# Patient Record
Sex: MALE | Race: WHITE | NOT HISPANIC OR LATINO | URBAN - METROPOLITAN AREA
[De-identification: names, ages, dates, MRNs, and addresses within clinical notes are randomized per-mention and may not be internally consistent; named-entity substitution may affect disease eponyms.]

---

## 2021-02-18 ENCOUNTER — TRANSFERRED RECORDS (OUTPATIENT)
Dept: HEALTH INFORMATION MANAGEMENT | Facility: CLINIC | Age: 56
End: 2021-02-18

## 2021-02-26 ENCOUNTER — TRANSFERRED RECORDS (OUTPATIENT)
Dept: HEALTH INFORMATION MANAGEMENT | Facility: CLINIC | Age: 56
End: 2021-02-26

## 2021-03-11 ENCOUNTER — TRANSFERRED RECORDS (OUTPATIENT)
Dept: HEALTH INFORMATION MANAGEMENT | Facility: CLINIC | Age: 56
End: 2021-03-11

## 2021-03-11 ENCOUNTER — MEDICAL CORRESPONDENCE (OUTPATIENT)
Dept: HEALTH INFORMATION MANAGEMENT | Facility: CLINIC | Age: 56
End: 2021-03-11

## 2021-03-11 LAB
ALT SERPL-CCNC: 39 U/L (ref 0–55)
AST SERPL-CCNC: 86 U/L (ref 0–35)
CREAT SERPL-MCNC: 0.79 MG/DL (ref 0.8–1.3)
GFR SERPL CREATININE-BSD FRML MDRD: >90 ML/MIN/1.73M2
GLUCOSE SERPL-MCNC: 90 MG/DL (ref 70–100)
POTASSIUM SERPL-SCNC: 4.3 MEQ/L (ref 3.5–5.3)

## 2021-03-19 ENCOUNTER — REFERRAL (OUTPATIENT)
Dept: TRANSPLANT | Facility: CLINIC | Age: 56
End: 2021-03-19

## 2021-03-19 VITALS — BODY MASS INDEX: 24.34 KG/M2 | WEIGHT: 170 LBS | HEIGHT: 70 IN

## 2021-03-19 DIAGNOSIS — C22.0 HCC (HEPATOCELLULAR CARCINOMA) (H): Primary | ICD-10-CM

## 2021-03-19 SDOH — HEALTH STABILITY: MENTAL HEALTH: HOW OFTEN DO YOU HAVE 6 OR MORE DRINKS ON ONE OCCASION?: NOT ASKED

## 2021-03-19 SDOH — HEALTH STABILITY: MENTAL HEALTH: HOW MANY STANDARD DRINKS CONTAINING ALCOHOL DO YOU HAVE ON A TYPICAL DAY?: NOT ASKED

## 2021-03-19 SDOH — HEALTH STABILITY: MENTAL HEALTH: HOW OFTEN DO YOU HAVE A DRINK CONTAINING ALCOHOL?: NOT ASKED

## 2021-03-19 ASSESSMENT — MIFFLIN-ST. JEOR: SCORE: 1604.42

## 2021-03-19 NOTE — TELEPHONE ENCOUNTER
Patient was asked the following questions during liver intake call.     Referring Provider: Jessica Kaur   Referring Diagnosis: Alcoholic Cirrhosis of the Liver/HCV/HCC  PCP: Kala Espinosa     1)Do you know why you have liver disease: Yes             If Alcoholic Cirrhosis is present when was your last drink:11/2020             Have you ever been through treatment for alcohol: Yes, 4 years ago.   2) Presence of Ascites: No Paracentesis: No  3) Presence of Hepatic Encephalopathy: Yes Medications: Yes  4) History of GI Bleeding: Yes  5) Oxygen Use: None  6) EGD: Yes Where: [place] When: 2020  7) Colonoscopy: None  8) MELD Score:   9) Insurance information: [facility]       Policy sales: Self       Subscriber/policy/ID number: [id number]      Group Number: [id number]    Referral intake process completed.  Patient is aware that after financial approval is received, medical records will be requested.   Patient confirmed for a callback from transplant coordinator on 3/24/21.  Tentative evaluation date TBD.    Confirmed coordinator will discuss evaluation process in more detail at the time of their call.   Patient is aware of the need to arrange age appropriate cancer screening, vaccinations, and dental care.  Reminded patient to complete questionnaire, complete medical records release, and review packet prior to evaluation visit .  Assessed patient for special needs (ie--wheelchair, assistance, guardian, and ):  None  Patient instructed to call [phone] with questions.     Patient gave verbal consent during intake call to obtain medical records and documents outside of [facility]:  Yes    FABY Link, LPN   Solid Organ Transplant
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
## 2021-03-19 NOTE — LETTER
3/25/2021    Juarez Warren  [address] [place] [id number]      Dear Juarez,    Thank you for your interest in the Transplant Center at [facility]. We look forward to being a part of your care team and assisting you through the transplant process.    As we discussed, your transplant coordinator is Jovanna Silverio (Liver).  You may call your coordinator at any time with questions or concerns call [phone].    Please complete the following.    1. Fill out and return the enclosed forms    Authorization for Electronic Communication    Authorization to Discuss Protected Health Information    Authorization for Release of Protected Health Information    Authorization for Care Everywhere Release of Information    2. Sign up for:    [employer] [facility], access to your electronic medical record (see enclosed pamphlet)    [email or web address], a transplant education website    You can use these tools to learn more about your transplant, communicate with your care team, and track your medical details      Sincerely,  Solid Organ Transplant  [facility]    cc: Referring Physician and PCP

## 2021-03-24 ENCOUNTER — TELEPHONE (OUTPATIENT)
Dept: TRANSPLANT | Facility: CLINIC | Age: 56
End: 2021-03-24

## 2021-03-24 NOTE — TELEPHONE ENCOUNTER
Patient Call: General  Route to LPN    Reason for call: connect with pt regarding referral     Call back needed? Yes    Return Call Needed  Same as documented in contacts section  When to return call?: Greater than one day: Route standard priority

## 2021-03-24 NOTE — TELEPHONE ENCOUNTER
Left message reiterating with Juarez the plan for this RN to call early next week once plan is in place for virtual consult or full evaluation depending on TC and insurance authorization.

## 2021-03-26 ENCOUNTER — DOCUMENTATION ONLY (OUTPATIENT)
Dept: TRANSPLANT | Facility: CLINIC | Age: 56
End: 2021-03-26

## 2021-03-26 NOTE — PROGRESS NOTES
Jaurez Warren discussed at the multidisciplinary tumor board on 2021. The attendees may consist of representatives from hepatology, oncology, radiation oncology, surgical subspecialty including liver transplant, pathology and radiology.    MRI 21 Imagin.3 cm lesion, not classic characterstic of HCC    Plan/Recommendations:  MRI locally now    FABY SalN, RN  Liver transplant coordinator  [phone] Office

## 2021-03-29 ENCOUNTER — TELEPHONE (OUTPATIENT)
Dept: TRANSPLANT | Facility: CLINIC | Age: 56
End: 2021-03-29

## 2021-03-29 DIAGNOSIS — F10.11 H/O ETOH ABUSE: Primary | ICD-10-CM

## 2021-03-29 NOTE — TELEPHONE ENCOUNTER
Patient Call: 1). Stated he was having issue with docusign-then stated he was able to see the forms  Then was asking about upcoming appts; if they were here or at [facility]??  Then ask writer who did he call??      Call back needed? Yes    Return Call Needed  Same as documented in contacts section  When to return call?: Same day: Route High Priority

## 2021-03-29 NOTE — LETTER
PHYSICIAN ORDERS  [phone]    DATE & TIME ISSUED: :04 AM  PATIENT NAME: Juarez Warren   : 1965     [facility] MR# : [id number]     DIAGNOSIS:  Cirrhosis  ICD-10 CODE: K74.60    MRI of liver     PLEASE FAX RESULTS and PUSH FILM AS SOON AS POSSIBLE TO [phone], ATTN:ORTIZ Nugent, [phone], with questions  .

## 2021-03-29 NOTE — Clinical Note
Please add to held spot with Dr. Hall on 4/26/21 and call patient to confirm. Confirm means of virtual visit.  Thanks! Jovanna

## 2021-03-31 NOTE — TELEPHONE ENCOUNTER
Spoke to Jessica BRAGA of MAULIK Kaur and discussed the plan for MRI and then virtual f/u with patient. She will facilitate ordering of MRI, order faxed.

## 2021-04-06 ENCOUNTER — TRANSFERRED RECORDS (OUTPATIENT)
Dept: HEALTH INFORMATION MANAGEMENT | Facility: CLINIC | Age: 56
End: 2021-04-06

## 2021-04-07 NOTE — TELEPHONE ENCOUNTER
Spoke with the patient and confirmed Liver Consult appointments on 4/26/21.  Informed patient an itinerary can be accessed on REHt.

## 2021-04-07 NOTE — TELEPHONE ENCOUNTER
Sent to scheduling 4/26 with Dr. Hall. Will discuss at TC this week but appears no HCC.  Will defer  since patient was non receptive to openly discuss past alcohol use.

## 2021-04-09 ENCOUNTER — TELEPHONE (OUTPATIENT)
Dept: TRANSPLANT | Facility: CLINIC | Age: 56
End: 2021-04-09

## 2021-04-09 ENCOUNTER — DOCUMENTATION ONLY (OUTPATIENT)
Dept: TRANSPLANT | Facility: CLINIC | Age: 56
End: 2021-04-09

## 2021-04-09 NOTE — TELEPHONE ENCOUNTER
Patient Call: Voicemail  Date/Time: 4/9/21 @ 8:18 am   Reason for call: regarding missing forms to fill out and return.

## 2021-04-09 NOTE — PROGRESS NOTES
Juarez Warren discussed at the multidisciplinary tumor board on 4/9/21. The attendees may consist of representatives from hepatology, oncology, radiation oncology, surgical subspecialty including liver transplant, pathology and radiology.    MRI 4/6/21 Imaging:  No HCC  [id number]    Plan/Recommendations:  MRI 6 month surveillance    SHERRY Sal, RN  Liver transplant coordinator

## 2021-04-09 NOTE — TELEPHONE ENCOUNTER
Left message informing Juarez not to worry about paperwork and be ready for the video virtual on 4/26/21. If he has further concerns, to call. .

## 2021-04-13 NOTE — TELEPHONE ENCOUNTER
RECORDS RECEIVED FROM: Internal   Appt Date: 04.26.2021   NOTES STATUS DETAILS   OFFICE NOTE from referring provider Internal 04.07.2021 Shirley Hall MD   OFFICE NOTES from other specialists Care Everywhere 03.19.2021 Jessica Kaur, APRN-CNP     02.22.2021  Claudia Samano MD     DISCHARGE SUMMARY from hospital N/A    MEDICATION LIST N/A    LIVER BIOSPY (IF APPLICABLE)      PATHOLOGY REPORTS  N/A    IMAGING     ENDOSCOPY (IF AVAILABLE) Care Everywhere 04.06.2021   COLONOSCOPY (IF AVAILABLE) Care Everywhere 04.09.2021   ULTRASOUND LIVER N/A    CT OF ABDOMEN Care Everywhere 02.15.2021 CT Abd Pelvis   MRI OF LIVER Care Everywhere 04.06.2021, 02.26.2021 MRI ABDOMEN WITH AND WITHOUT CONTRAST     FIBROSCAN, US ELASTOGRAPHY, FIBROSIS SCAN, MR ELASTOGRAPHY N/A    LABS     HEPATIC PANEL (LIVER PANEL) N/A    BASIC METABOLIC PANEL Care Everywhere 03.19.2021   COMPLETE METABOLIC PANEL Care Everywhere 03.19.2021   COMPLETE BLOOD COUNT (CBC) Care Everywhere 03.19.2021   INTERNATIONAL NORMALIZED RATIO (INR) Care Everywhere 03.19.2021   HEPATITIS C ANTIBODY N/A    HEPATITIS C VIRAL LOAD/PCR N/A    HEPATITIS C GENOTYPE N/A    HEPATITIS B SURFACE ANTIGEN Care Everywhere 03.19.2021   HEPATITIS B SURFACE ANTIBODY Care Everywhere 03.19.2021   HEPATITIS B DNA QUANT LEVEL N/A    HEPATITIS B CORE ANTIBODY Care Everywhere 03.19.2021

## 2021-04-26 ENCOUNTER — VIRTUAL VISIT (OUTPATIENT)
Dept: GASTROENTEROLOGY | Facility: CLINIC | Age: 56
End: 2021-04-26
Attending: INTERNAL MEDICINE
Payer: COMMERCIAL

## 2021-04-26 ENCOUNTER — PRE VISIT (OUTPATIENT)
Dept: GASTROENTEROLOGY | Facility: CLINIC | Age: 56
End: 2021-04-26

## 2021-04-26 DIAGNOSIS — F10.11 H/O ETOH ABUSE: ICD-10-CM

## 2021-04-26 PROCEDURE — 99205 OFFICE O/P NEW HI 60 MIN: CPT | Mod: 95 | Performed by: INTERNAL MEDICINE

## 2021-04-26 RX ORDER — TAMSULOSIN HYDROCHLORIDE 0.4 MG/1
0.4 CAPSULE ORAL
COMMUNITY
Start: 2021-02-17

## 2021-04-26 RX ORDER — ONDANSETRON 4 MG/1
4 TABLET, ORALLY DISINTEGRATING ORAL
COMMUNITY
Start: 2020-06-21

## 2021-04-26 RX ORDER — FLUTICASONE PROPIONATE 50 MCG
1 SPRAY, SUSPENSION (ML) NASAL
COMMUNITY
Start: 2020-06-21 | End: 2021-06-26

## 2021-04-26 RX ORDER — HYDROXYZINE PAMOATE 25 MG/1
25-50 CAPSULE ORAL
COMMUNITY

## 2021-04-26 RX ORDER — AMILORIDE HYDROCHLORIDE 5 MG/1
5 TABLET ORAL
COMMUNITY
Start: 2021-03-08 | End: 2022-03-13

## 2021-04-26 RX ORDER — LACTULOSE 10 G/15ML
30 SOLUTION ORAL
COMMUNITY
Start: 2021-02-18

## 2021-04-26 RX ORDER — PROPRANOLOL HYDROCHLORIDE 10 MG/1
10 TABLET ORAL
COMMUNITY
Start: 2021-02-03

## 2021-04-26 RX ORDER — PREGABALIN 200 MG/1
200 CAPSULE ORAL
COMMUNITY
Start: 2021-02-26

## 2021-04-26 RX ORDER — TIZANIDINE 2 MG/1
1 TABLET ORAL
COMMUNITY
Start: 2021-02-26

## 2021-04-26 RX ORDER — RIBAVIRIN 200 MG/1
600 TABLET, FILM COATED ORAL
COMMUNITY
Start: 2021-03-02

## 2021-04-26 ASSESSMENT — PAIN SCALES - GENERAL: PAINLEVEL: NO PAIN (0)

## 2021-04-26 NOTE — PROGRESS NOTES
"  [facility]  LIVER CLINIC CONSULTATION  VIDEO VISIT    CC/REFERRING PROVIDER:  Jesisca Kaur NP [facility]  REASON FOR CONSULTATION: HCV and ETOH cirrhosis    ASSESSMENT/PLAN:  Mr. Warren is a 55 Y M with ETOH and HCV related cirrhosis and possible liver lesion. MELD 20 AOB    Cirrhosis 2/2 ETOH and HCV. Decompensated with ascites, HE and synthetic dysfunction. Improved liver function since stopping ETOH 11/2020. TB peak 13.5, now 2.5. May continue to improve with continues sobriety and treatment of HCV.    Hepatic encephalopathy Controlled on lactulose  Ascites Noted on most recent MRI, mild.  Never had paracentesis.  Currently on diuretics and controlled.  Dscussed sodium restriction.  Esophageal/Gastric varices  EGD 11/2020 small varices.  He is currently on propanolol, but they have not been able to increase his dose.  Liver lesion Most recent MRI from 4/2021 reviewed all tumor board without any evidence of HCC. LIRADS 3. Recommend 6 month followup.    AUD severe in early remission PETH 3/2021 negative.    Mental health Patient states that he feels like his life is worthless and slipping away.  He feels quite hopeless. No plans to harm himself.  Symptoms significant for major depression, and he has a planned visit with his mental health provider in July 2021. Discussed that this is important to help treat his depression, as well as management of his polysubstance abuse and alcohol use disorder.  This is a major step if he is to be ever considered for liver transplantation in the future.     In addition, he has documentation of narcissistic personality disorder. Although this is unclear, based on my one interaction with him, his distrust of therapists, and feelings of alcohol treatment not being \"good enough\" may be a sign of this.    Transplant candidacy At this time, he has had significant improvement from his hospitalization up till now.  He is less than 6 months sober, and has no evidence of " HCC.  Based on these findings, transplant is not indicated at this time.  It is reasonable to continue to monitor, and if he worsens then liver transplant may be discussed again.  It is important to note that he has significant barriers to being a liver transplant candidate . These include poor social support, and mental health issues [patient has significant depression, history of drug and alcohol use, possible narcissistic personality disorder, and his distrust of therapists and alcohol treatment strategies].  RECOMMENDATIONS:  -- Resume followup with [facility] team.  -- See Dr. Hall in [place] in July.  -- Follow up with  as planned  -- Continue to avoid alcohol  -- Lactulose PO, titrate to 3-4 BMs per day  -- Consider re-application for HCV treatment  -- Needs SW follow up in [place]   -- Continue exercising to increases strength  -- Recommend repeat MRI liver in 3-6 months   -- Ensure sodium restriction to 2000 mg per day  -- Continue diuretics  -- Adequate protein diet (1.2 - 1.5g/kg/day)   - Recommend multiple meals during the day, Snacks and shakes during the day/night   - Can use Ensure/Boost drinks TID     Colorectal Cancer Screening Due in 7 - 10 years      The visit lasted up to 40 minutes, with more than half of the time spent on counseling and education. All questions were answered to patient's satisfaction    Patient seen and discussed with staff GI physician, Dr. Hall, who agrees with my assessment and plan.     Steve Avalos MD  Transplant Hepatology fellow  PGY 6  [phone]    -----------------------------------------------------------------------------------------------------------------------------------------    HPI: 55 year old male with a medical history significant for alcohol use disorder, prior polysubstance abuse, hepatitis C infection and hypertension with consequent development of cirrhosis and portal hypertension; seen for evaluation for severe liver disease and  "possible liver lesion.    Patient states that he was diagnosed with hepatitis C about 20 to 30 years ago.  He stated that he was treated with PEG interferon at that time and had improvement in his viral load.  However he was using IV cocaine at that time and was reinfected.  He has not had any treatments for this again, and did well until November 2020 when he was admitted into the hospital with nausea, hematemesis, and melena as well as fatigue and weakness.  He was found to have cirrhosis at that time.  He underwent an upper endoscopy that was significant for severe esophagitis, and a colonoscopy that showed internal hemorrhoids.  Since then he has been followed by his local GI provider.    He has mild ascites and pedal edema managed with diuretics.  He also developed hepatic encephalopathy while he was in the hospital, and is currently on lactulose for this.  He takes this twice a day and has about 2-3 bowel movements per day.  He notes that he has episodes of confusion that are mild and states that he sometimes is forgetful.  He denies any fevers, chills, chest pain, difficulty breathing, repeat episodes of melena, or hematemesis, or hematochezia.  He has never had paracentesis done in the past.  Recently, he had an MRI that showed a lesion that was concerning for HCC however a repeat MRI in April noted that this lesion was possibly a dysplastic nodule. He was attempted to be started on HCV treatment but was denied by his insurance.    His major issue now is that he feels like his \"life is slipping away\".  He feels like he will continue to get worse, and feels like his liver is continuing to worsen.  Since his hospitalization he has noted that his strength is improving, and he is working out with 5 pound weights to improve his strength.  He has had trouble with his diet stemming from his prolonged drug and alcohol use.  He was previously homeless and is now living in an apartment.  He is currently in the process " of applying for Social Security disability to help him improve his diet.  He said per his last discussion with his , things are looking pretty good in terms of his Social Security benefits.    ROS: 10pt ROS performed and otherwise negative.    PERTINENT PAST MEDICAL HISTORY:  Cirrhosis with portal hypertension  Alcohol use disorder  Chronic back pain  Hepatitis C infection  Hypertension  Traumatic brain injury  Narcissistic personality disorder    PREVIOUS ABDOMINAL/GYNECOLOGIC SURGERIES:  L5-S1 spine surgery in 2008  Cholecystectomy in the 80's  Herniorrhaphy (umbilical) in the 80's  Appendectomy in the 90's    PREVIOUS ENDOSCOPY:  Colonoscopy/9/2021: 1 polyp removed, internal hemorrhoids.  EGD 11/2020: LA grade C esophagitis, small esophageal varices without any evidence of recent or active bleeding.    PERTINENT MEDICATIONS:    aMILoride (MIDAMOR) 5 mg tablet Take 1 tablet (5 mg) by mouth 2 times a day 180 tablet 3     ribavirin 200 mg tablet Take 3 tablets (600 mg) by mouth 1 time per day 84 tablet 2     omeprazole (PRILOSEC) 20 mg capsule Take 1 capsule (20 mg) by mouth 1 time a day in the morning 180 capsule 4     sofosbuvir-velpatasvir (EPCLUSA) 400-100 mg tablet Take 1 tablet by mouth 1 time per day 28 tablet 2     pregabalin (LYRICA) 200 mg capsule Take 200 mg by mouth 2 times a day 0     tiZANidine (ZANAFLEX) 2 mg tablet Take 0.5 tablets (1 mg) by mouth every night at bedtime 45 tablet 4     gabapentin (NEURONTIN) 300 mg capsule Take 1 capsule (300 mg) by mouth 3 times a day Patient would like the medication delivered (Patient not taking: Reported on 3/11/2021) 90 capsule 3     lactulose 10 GM/15ML oral solution Take 30 mL by mouth 2 times a day 1892 mL 6     tamsulosin (FLOMAX) 0.4 mg capsule Take 1 capsule (0.4 mg) by mouth 1 time per day 30 capsule 3     spironolactone (ALDACTONE) 50 mg tablet Take 1 tablet (50 mg) by mouth 1 time per day 30 tablet 4     propranolol (INDERAL) 10 mg tablet  Take 1 tablet (10 mg) by mouth 1 time per day 30 tablet 4     ondansetron (ZOFRAN ODT) 4 mg dispersible tablet Take 1 tablet (4 mg) by mouth Every 4 hours as needed for nausea or vomiting 20 tablet 0     fluticasone (FLONASE) 50 mcg/spray nasal spray Spray 1 spray into each nostril 2 times a day 3 Bottle 0     traZODone (DESYREL) 50 mg tablet Take 50 mg by mouth every night at bedtime     hydrOXYzine pamoate (VISTARIL) 25 mg capsule Take 25-50 mg by mouth 4 times a day as needed for anxiety     SOCIAL HISTORY:  Patient admits to alcohol use, stating that he would drink 6 to 12 cans of beer per day.  He had a lot of alcohol during Easter of last year, and was sober for a little bit and then had more alcohol on the day of admission in November 2020.  He has had 3 DUIs, the last one coming about 5 years ago.  He stated that he has had treatment in the past, and does not like them because he feels like they did not work.  He denies any hospitalizations for withdrawal or intoxication.  His last drink was in November 2020.  Patient admits to cocaine use, methamphetamine use, refuses about 3 years ago.  He states that he has not used cocaine for many many years.  He denies any tobacco use.  He has been on disability for over 10 years given his back pain.  He previously worked as a .  States that he has a stepfamily that he is  from.    FAMILY HISTORY:  No colon/panc/esophageal/other GI CA, no other HNPCC-related Toy. No IBD/celiac, no other AI/liver/thyroid disease.    PHYSICAL EXAMINATION:    General: Alert, not in respiratory or painful distress, Not toxic looking  HEENT: anicteric sclera, PERRL,   Lungs: Unlabored breathing  Skin: no rashes, cyanosis, or jaundice  Neuro:  AOx3, CN II-XII intact and symmetric, No focal neurologic deficits  MSK:No discernible joint swelling or tenderness  Psych: Seems depressed      PERTINENT STUDIES:  Sodium 132, potassium 4.1, chloride 105, BUN 11, creatinine  0.97.  Hemoglobin 14.4, WBC 4.6, platelet 46.  AST 62, ALT 34, alkaline phosphatase 133, bilirubin 2.5.  INR 1.7.  Hepatitis C RNA: ~500,000 [2/2/2021]  PETh negative on 3/11/2021     MELD-Na 20    MRI 4/6/2021:  Cirrhosis, 1cm dysplastic nodule, small ascites      Juarez is a 55 year old who is being evaluated via a billable video visit.      ** patient can do Doximity **    How would you like to obtain your AVS? Mail a copy  If the video visit is dropped, the invitation should be resent by: Text to cell phone: [phone]  Will anyone else be joining your video visit? No      Video Start Time: 1000  Video-Visit Details  Type of service:  Video Visit  Video End Time:1039  Originating Location (pt. Location): Home\  Distant Location (provider location):  [facility] [place]   Platform used for Video Visit: DoximWedding Spot

## 2021-04-26 NOTE — LETTER
4/26/2021         RE: Juarez Warren  [address]  [address]  [place] [address]        Dear Colleague,    Thank you for referring your patient, Juarez Warren, to the [facility] [place]. Please see a copy of my visit note below.      [facility]  LIVER CLINIC CONSULTATION  VIDEO VISIT    CC/REFERRING PROVIDER:  Jessica Kaur NP [facility]  REASON FOR CONSULTATION: HCV and ETOH cirrhosis    ASSESSMENT/PLAN:  Mr. Warren is a 55 Y M with ETOH and HCV related cirrhosis and possible liver lesion. MELD 20 AOB    Cirrhosis 2/2 ETOH and HCV. Decompensated with ascites, HE and synthetic dysfunction. Improved liver function since stopping ETOH 11/2020. TB peak 13.5, now 2.5. May continue to improve with continues sobriety and treatment of HCV.    Hepatic encephalopathy Controlled on lactulose  Ascites Noted on most recent MRI, mild.  Never had paracentesis.  Currently on diuretics and controlled.  Dscussed sodium restriction.  Esophageal/Gastric varices  EGD 11/2020 small varices.  He is currently on propanolol, but they have not been able to increase his dose.  Liver lesion Most recent MRI from 4/2021 reviewed all tumor board without any evidence of HCC. LIRADS 3. Recommend 6 month followup.    AUD severe in early remission PETH 3/2021 negative.    Mental health Patient states that he feels like his life is worthless and slipping away.  He feels quite hopeless. No plans to harm himself.  Symptoms significant for major depression, and he has a planned visit with his mental health provider in July 2021. Discussed that this is important to help treat his depression, as well as management of his polysubstance abuse and alcohol use disorder.  This is a major step if he is to be ever considered for liver transplantation in the future.     In addition, he has documentation of narcissistic personality disorder. Although this is unclear, based on my one interaction with him, his  "distrust of therapists, and feelings of alcohol treatment not being \"good enough\" may be a sign of this.    Transplant candidacy At this time, he has had significant improvement from his hospitalization up till now.  He is less than 6 months sober, and has no evidence of HCC.  Based on these findings, transplant is not indicated at this time.  It is reasonable to continue to monitor, and if he worsens then liver transplant may be discussed again.  It is important to note that he has significant barriers to being a liver transplant candidate . These include poor social support, and mental health issues [patient has significant depression, history of drug and alcohol use, possible narcissistic personality disorder, and his distrust of therapists and alcohol treatment strategies].  RECOMMENDATIONS:  -- Resume followup with [facility] team.  -- See Dr. Hall in [place] in July.  -- Follow up with  as planned  -- Continue to avoid alcohol  -- Lactulose PO, titrate to 3-4 BMs per day  -- Consider re-application for HCV treatment  -- Needs SW follow up in [place]   -- Continue exercising to increases strength  -- Recommend repeat MRI liver in 3-6 months   -- Ensure sodium restriction to 2000 mg per day  -- Continue diuretics  -- Adequate protein diet (1.2 - 1.5g/kg/day)   - Recommend multiple meals during the day, Snacks and shakes during the day/night   - Can use Ensure/Boost drinks TID     Colorectal Cancer Screening Due in 7 - 10 years      The visit lasted up to 40 minutes, with more than half of the time spent on counseling and education. All questions were answered to patient's satisfaction    Patient seen and discussed with staff GI physician, Dr. Hall, who agrees with my assessment and plan.     Steve Avalos MD  Transplant Hepatology fellow  PGY " "6  [phone]    -----------------------------------------------------------------------------------------------------------------------------------------    HPI: 55 year old male with a medical history significant for alcohol use disorder, prior polysubstance abuse, hepatitis C infection and hypertension with consequent development of cirrhosis and portal hypertension; seen for evaluation for severe liver disease and possible liver lesion.    Patient states that he was diagnosed with hepatitis C about 20 to 30 years ago.  He stated that he was treated with PEG interferon at that time and had improvement in his viral load.  However he was using IV cocaine at that time and was reinfected.  He has not had any treatments for this again, and did well until November 2020 when he was admitted into the hospital with nausea, hematemesis, and melena as well as fatigue and weakness.  He was found to have cirrhosis at that time.  He underwent an upper endoscopy that was significant for severe esophagitis, and a colonoscopy that showed internal hemorrhoids.  Since then he has been followed by his local GI provider.    He has mild ascites and pedal edema managed with diuretics.  He also developed hepatic encephalopathy while he was in the hospital, and is currently on lactulose for this.  He takes this twice a day and has about 2-3 bowel movements per day.  He notes that he has episodes of confusion that are mild and states that he sometimes is forgetful.  He denies any fevers, chills, chest pain, difficulty breathing, repeat episodes of melena, or hematemesis, or hematochezia.  He has never had paracentesis done in the past.  Recently, he had an MRI that showed a lesion that was concerning for HCC however a repeat MRI in April noted that this lesion was possibly a dysplastic nodule. He was attempted to be started on HCV treatment but was denied by his insurance.    His major issue now is that he feels like his \"life is " "slipping away\".  He feels like he will continue to get worse, and feels like his liver is continuing to worsen.  Since his hospitalization he has noted that his strength is improving, and he is working out with 5 pound weights to improve his strength.  He has had trouble with his diet stemming from his prolonged drug and alcohol use.  He was previously homeless and is now living in an apartment.  He is currently in the process of applying for Social Security disability to help him improve his diet.  He said per his last discussion with his , things are looking pretty good in terms of his Social Security benefits.    ROS: 10pt ROS performed and otherwise negative.    PERTINENT PAST MEDICAL HISTORY:  Cirrhosis with portal hypertension  Alcohol use disorder  Chronic back pain  Hepatitis C infection  Hypertension  Traumatic brain injury  Narcissistic personality disorder    PREVIOUS ABDOMINAL/GYNECOLOGIC SURGERIES:  L5-S1 spine surgery in 2008  Cholecystectomy in the 80's  Herniorrhaphy (umbilical) in the 80's  Appendectomy in the 90's    PREVIOUS ENDOSCOPY:  Colonoscopy/9/2021: 1 polyp removed, internal hemorrhoids.  EGD 11/2020: LA grade C esophagitis, small esophageal varices without any evidence of recent or active bleeding.    PERTINENT MEDICATIONS:    aMILoride (MIDAMOR) 5 mg tablet Take 1 tablet (5 mg) by mouth 2 times a day 180 tablet 3     ribavirin 200 mg tablet Take 3 tablets (600 mg) by mouth 1 time per day 84 tablet 2     omeprazole (PRILOSEC) 20 mg capsule Take 1 capsule (20 mg) by mouth 1 time a day in the morning 180 capsule 4     sofosbuvir-velpatasvir (EPCLUSA) 400-100 mg tablet Take 1 tablet by mouth 1 time per day 28 tablet 2     pregabalin (LYRICA) 200 mg capsule Take 200 mg by mouth 2 times a day 0     tiZANidine (ZANAFLEX) 2 mg tablet Take 0.5 tablets (1 mg) by mouth every night at bedtime 45 tablet 4     gabapentin (NEURONTIN) 300 mg capsule Take 1 capsule (300 mg) by mouth 3 times a " day Patient would like the medication delivered (Patient not taking: Reported on 3/11/2021) 90 capsule 3     lactulose 10 GM/15ML oral solution Take 30 mL by mouth 2 times a day 1892 mL 6     tamsulosin (FLOMAX) 0.4 mg capsule Take 1 capsule (0.4 mg) by mouth 1 time per day 30 capsule 3     spironolactone (ALDACTONE) 50 mg tablet Take 1 tablet (50 mg) by mouth 1 time per day 30 tablet 4     propranolol (INDERAL) 10 mg tablet Take 1 tablet (10 mg) by mouth 1 time per day 30 tablet 4     ondansetron (ZOFRAN ODT) 4 mg dispersible tablet Take 1 tablet (4 mg) by mouth Every 4 hours as needed for nausea or vomiting 20 tablet 0     fluticasone (FLONASE) 50 mcg/spray nasal spray Spray 1 spray into each nostril 2 times a day 3 Bottle 0     traZODone (DESYREL) 50 mg tablet Take 50 mg by mouth every night at bedtime     hydrOXYzine pamoate (VISTARIL) 25 mg capsule Take 25-50 mg by mouth 4 times a day as needed for anxiety     SOCIAL HISTORY:  Patient admits to alcohol use, stating that he would drink 6 to 12 cans of beer per day.  He had a lot of alcohol during Easter of last year, and was sober for a little bit and then had more alcohol on the day of admission in November 2020.  He has had 3 DUIs, the last one coming about 5 years ago.  He stated that he has had treatment in the past, and does not like them because he feels like they did not work.  He denies any hospitalizations for withdrawal or intoxication.  His last drink was in November 2020.  Patient admits to cocaine use, methamphetamine use, refuses about 3 years ago.  He states that he has not used cocaine for many many years.  He denies any tobacco use.  He has been on disability for over 10 years given his back pain.  He previously worked as a .  States that he has a stepfamily that he is  from.    FAMILY HISTORY:  No colon/panc/esophageal/other GI CA, no other HNPCC-related Toy. No IBD/celiac, no other AI/liver/thyroid disease.    PHYSICAL  EXAMINATION:    General: Alert, not in respiratory or painful distress, Not toxic looking  HEENT: anicteric sclera, PERRL,   Lungs: Unlabored breathing  Skin: no rashes, cyanosis, or jaundice  Neuro:  AOx3, CN II-XII intact and symmetric, No focal neurologic deficits  MSK:No discernible joint swelling or tenderness  Psych: Seems depressed      PERTINENT STUDIES:  Sodium 132, potassium 4.1, chloride 105, BUN 11, creatinine 0.97.  Hemoglobin 14.4, WBC 4.6, platelet 46.  AST 62, ALT 34, alkaline phosphatase 133, bilirubin 2.5.  INR 1.7.  Hepatitis C RNA: ~500,000 [2/2/2021]  PETh negative on 3/11/2021     MELD-Na 20    MRI 4/6/2021:  Cirrhosis, 1cm dysplastic nodule, small ascites      Juarez is a 55 year old who is being evaluated via a billable video visit.      ** patient can do Doximity **    How would you like to obtain your AVS? Mail a copy  If the video visit is dropped, the invitation should be resent by: Text to cell phone: [phone]  Will anyone else be joining your video visit? No      Video Start Time: 1000  Video-Visit Details  Type of service:  Video Visit  Video End Time:1039  Originating Location (pt. Location): Home\  Distant Location (provider location):  [facility]PATOLOG[facility] [place]   Platform used for Video Visit: [employer]             Again, thank you for allowing me to participate in the care of your patient.        Sincerely,        Shirley Hall MD

## 2021-11-17 ENCOUNTER — TELEPHONE (OUTPATIENT)
Dept: TRANSPLANT | Facility: CLINIC | Age: 56
End: 2021-11-17
Payer: MEDICAID

## 2021-11-17 ENCOUNTER — REFERRAL (OUTPATIENT)
Dept: TRANSPLANT | Facility: CLINIC | Age: 56
End: 2021-11-17
Payer: MEDICAID

## 2021-11-17 DIAGNOSIS — K70.30 CIRRHOSIS, ALCOHOLIC (H): Primary | ICD-10-CM

## 2021-11-17 DIAGNOSIS — K70.30 ALCOHOLIC CIRRHOSIS (H): Primary | ICD-10-CM

## 2021-11-17 NOTE — TELEPHONE ENCOUNTER
lvm for patient, per Dr. RAYA Hall, should be seen virtually by Dr. Hall, phone appointment ith social work, re- referred for potential new lesion.    Orders to scheduling

## 2021-11-17 NOTE — Clinical Note
Dr. Shirley Hall wanted to re-refer this velvet.  I opened a skeleton of a new referral episode just to document.  I left a voice mail for the patient, we are just starting him with hepatology and social work consults.  Marily, I don't think you need to call him per se, just whatever else needed in encounter, etc. And all the stuff I don't know how to do in the episode.

## 2021-11-17 NOTE — TELEPHONE ENCOUNTER
Re-referred per Dr. Shirley Hall for new liver lesion;  Plan: virtual appointments with RAYA Hall and social work to start, will await appointments and determine f will continue with alfieal

## 2021-11-17 NOTE — Clinical Note
Scheduling, I just want to make sure this is in work queue after speaking with patient this morning, orders sent last week.  He should have phone appt with Maria E first, then video follow up appt with Dr. Hall (both next available, just make sure social work is first, please.)  Maria E,  No CD eval ordered as of now.  Not sure if you ever met with him when he was referral in April, was Jovanna's patient at that time.  Lots of psychosocial issues, very resistant to taking about CD hx so no CD eval ordered unless further direction from you.

## 2021-11-17 NOTE — LETTER
2/23/2021    Juarez Warren  [address] [place] [id number]      Dear Juarez,    Thank you for your interest in the Transplant Center at [facility]. We look forward to being a part of your care team and assisting you through the transplant process.    As we discussed, your transplant coordinator is Deborah Malik (Liver).  You may call your coordinator at any time with questions or concerns call [phone].    Please complete the following.    1. Fill out and return the enclosed forms    Authorization for Electronic Communication    Authorization to Discuss Protected Health Information    Authorization for Release of Protected Health Information    Authorization for Care Everywhere Release of Information    2. Sign up for:    [employer], access to your electronic medical record (see enclosed pamphlet)    [email or web address], a transplant education website    You can use these tools to learn more about your transplant, communicate with your care team, and track your medical details      Sincerely,  Solid Organ Transplant  [facility]    cc: Referring Physician and PCP

## 2021-11-18 VITALS — HEIGHT: 70 IN | BODY MASS INDEX: 25.77 KG/M2 | WEIGHT: 180 LBS

## 2021-11-18 ASSESSMENT — MIFFLIN-ST. JEOR: SCORE: 1652.72

## 2021-11-18 NOTE — TELEPHONE ENCOUNTER
Patient was asked the following questions during liver intake call.     Referring Provider: Jessica Kaur   Referring Diagnosis: Liver Lesion/Alcoholic Cirrhosis   PCP: Dr. Lennox Holder    1)Do you know why you have liver disease: Yes       If Alcoholic Cirrhosis is present when was your last drink: November 2020       Have you ever been through treatment for alcohol: Yes  2) Presence of Ascites: Yes Paracentesis: Yes  3) Presence of Hepatic Encephalopathy: Yes Medications: Yes  4) History of GI Bleeding: Yes  5) Oxygen Use: None  6) EGD: Yes Where: [place] When: 2021  7) Colonoscopy: Yes Where: [place] When: 2021  8) MELD Score: 18  9) Labs available for review from PCP/GI: Yes   10) HCC Diagnosis: Not at this time. Liver lesion present.   11)Insurance information: [place] Medicaid (Getting BCBS after the first of the year)       Policy sales: Self       Subscriber/policy/ID number: [id number]      Group Number:     Referral intake process completed.  Patient is aware that after financial approval is received, medical records will be requested.   Patient confirmed for a callback from transplant coordinator on 11/22/21.  Tentative evaluation date TBD.    Confirmed coordinator will discuss evaluation process in more detail at the time of their call.   Patient is aware of the need to arrange age appropriate cancer screening, vaccinations, and dental care.  Reminded patient to complete questionnaire, complete medical records release, and review packet prior to evaluation visit .  Assessed patient for special needs (ie--wheelchair, assistance, guardian, and ):  None  Patient instructed to call [phone] with questions.     Patient gave verbal consent during intake call to obtain medical records and documents outside of [facility]:  Yes     FABY Link, LPN       Solid Organ Transplant

## 2021-11-22 ENCOUNTER — TELEPHONE (OUTPATIENT)
Dept: GASTROENTEROLOGY | Facility: CLINIC | Age: 56
End: 2021-11-22
Payer: MEDICAID

## 2021-11-22 NOTE — TELEPHONE ENCOUNTER
"LIVER DISEASE ETOH, HCC, HCV  REFERRING PROVIDER Jessica Kaur, [facility]  -----------------------------------------------------------------------------------------------------------------------------  MELD   18      ABO O       HISTORY OF LIVER DISEASE  Patient underwent transplant eval this spring for ETOH cirrhosis;  Was improving and had psychosocial concerns related to transplant candidacy, eval closed- see Dr. Hall note from April 2021 for detailed hx;  Patient with new 2 cm lesion concerning for HCC and re-referred    Repeatedly states he will discuss but resistant to CD eval or treatment;  Told him social work will discuss with him further; states he does not like to take about his chem dep history at all; states he doesn't know if he can say the right things to \"pass the test when talking about my drug and alcohol history\"     Major complaints now are abdominal pain, BLE edema and pain      IR Treatment for HCC planned for 12/1/21    Last ETOH approx 1 year ago  Drug use: using speed last 5-6 years ago; does smoke marijuana currently    Ascites  On Kyle and Furosmide  Du 2 x a week  TIPS no/yes  HE on lactulose  Kidney function  Variceal screening Last EGD. Location. Varices no/yes. Rec follow up.  HCC Screening MRI October 2021      Hospitalizations  ---------------------------------------------------------------------------------------------------------------------------------  PMH  TBI, narcissistic personality disorder  Seizures/stroke hx??  - need to investigate reported hx, pt can't remember  Heart murmur ? Reported in medical hx, pt doesn't remember    Diabetes no   Abdominal surgery appy; dilma, hernia repair    Colonoscopy April 2021; last EGD October 2021      SHX  Lives alone in apartment in [place];  Has brother and sister that \"help a little bit\"; former  as occupation, hasn't worked for 7-8 years ago, stopped due work comp injust on back,     Reports parents death as " significant event  ---------------------------------------------------------------------------------------------------------------------------------  LDLT Discussed no not at this time  PLAN  Per Dr. Hall, follow up with her and social work evaluation to start; consulting with IR locally on treatment plan for lesion; Dr. Hall's and social work appointments will determine next steps;

## 2021-11-22 NOTE — TELEPHONE ENCOUNTER
called patient to schedule Rafael & LINDSAY appts virtual and he confirmed for Mon, Dec 6.  But he has lots of questions that I can't answer, could you please call him.

## 2021-12-06 ENCOUNTER — VIRTUAL VISIT (OUTPATIENT)
Dept: GASTROENTEROLOGY | Facility: CLINIC | Age: 56
End: 2021-12-06
Attending: INTERNAL MEDICINE
Payer: MEDICAID

## 2021-12-06 DIAGNOSIS — K76.82 HEPATIC ENCEPHALOPATHY (H): ICD-10-CM

## 2021-12-06 DIAGNOSIS — Z76.82 AWAITING ORGAN TRANSPLANT STATUS: ICD-10-CM

## 2021-12-06 DIAGNOSIS — F10.21 ALCOHOL USE DISORDER, SEVERE, IN EARLY REMISSION (H): ICD-10-CM

## 2021-12-06 DIAGNOSIS — C22.0 HCC (HEPATOCELLULAR CARCINOMA) (H): ICD-10-CM

## 2021-12-06 DIAGNOSIS — K70.31 ALCOHOLIC CIRRHOSIS OF LIVER WITH ASCITES (H): Primary | ICD-10-CM

## 2021-12-06 PROCEDURE — G0463 HOSPITAL OUTPT CLINIC VISIT: HCPCS | Mod: PN,RTG | Performed by: INTERNAL MEDICINE

## 2021-12-06 PROCEDURE — 99214 OFFICE O/P EST MOD 30 MIN: CPT | Mod: 95 | Performed by: INTERNAL MEDICINE

## 2021-12-06 RX ORDER — ESCITALOPRAM OXALATE 5 MG/1
5 TABLET ORAL
COMMUNITY
Start: 2021-07-28 | End: 2022-08-02

## 2021-12-06 RX ORDER — BACLOFEN 5 MG/1
5 TABLET ORAL
COMMUNITY
Start: 2021-11-19 | End: 2022-11-24

## 2021-12-06 ASSESSMENT — PAIN SCALES - GENERAL: PAINLEVEL: MODERATE PAIN (4)

## 2021-12-06 NOTE — PROGRESS NOTES
"  [facility]  LIVER CLINIC CONSULTATION  VIDEO VISIT    CC/REFERRING PROVIDER:  Jessica Kaur NP [facility]  REASON FOR CONSULTATION: HCV and ETOH cirrhosis with HCC    ASSESSMENT/PLAN:  Mr. Warren is a 56 Y M with ETOH and HCV related cirrhosis and HCC. MELD 20 ABO O    Cirrhosis 2/2 ETOH and HCV. Decompensated with ascites, HE and synthetic dysfunction. Improved liver function since stopping ETOH 11/2020. TB peak 13.5, now 2.4.  HCC 2 cm s/p TACE at [place]. They will get MRI follow up.  Hepatic encephalopathy Controlled on lactulose  Ascites Noted on most recent MRI, mild.  Never had paracentesis.  Currently on diuretics and controlled.  Dscussed sodium restriction.  Esophageal/Gastric varices  EGD 11/2020 small varices.  He is currently on propanolol, but they have not been able to increase his dose.  Colorectal Cancer Screening Due in 7 - 10 years    AUD severe in early remission PET 3/2021 negative.    Mental health Significant concern with this> Long h/o mental health issues, currently no care and no follow up after July visit. We discussed this a rec follow up as previously recommended by behavioral health at [place].    In addition, he has documentation of narcissistic personality disorder. His distrust of therapists, and feelings of alcohol treatment not being \"good enough\" may be a sign of this.    Transplant candidacy He has significant barriers to transplant. These include poor social support and mental health issues (significant depression, significant history of drug and alcohol use, possible narcissistic personality disorder, and his distrust of therapists and alcohol treatment strategies). This was discussed with him today and with .    Will plan to see him back pending progress on these issues. In meantime will follow with Jessica Kaur NP [facility].      Shirley Hall MD    Hepatology/Liver Transplant  Medical Director, Liver Transplantation  [facility] " "[facility]    Appointments [phone]  Clinic Fax [phone]  Transplant Care [phone] Option 4  Transplant Fax [phone]  Administrative Office [phone]  Administrative Fax [phone]  ==========================================    HPI: Mr. Warren is a 56 Y M with HCC and ETOH-related cirrhosis. I met with him once 4/2021. Since that time he has developed imaging criteria for HCC: 2 cm lesion which we reviewed at tumor conference.    Today he tells me that he feels horrible with swelling, dark urine, guts hurt. His stool looks dark brown. Vomiting in the morning for a while off and on. He repeatedly states that his health is bad and that he is afraid. He is not giving up but he is tired.    HCC 2 cm dx10/19/21  s/p TACE 12/2/21    HCV  Dx ~20 y ago. Rx PEG interferon.  However was using IV cocaine at that time and was reinfected. He has not had any treatments for this again, and did well until November 2020 when he was admitted with nausea, hematemesis, melena, fatigue and weakness.  He was found to have cirrhosis at that time.   VICTORIANO  Last ETOH 11/2020  Usual pattern was  6-12 cans of beer/day.    He has had 3 DUIs, the last about 5 years ago.   Has had treatment in the past, states he didn't like it,didn't think it worked    Past cocaine, methamphetamine use, last about 3 years ago.  He states that he has not used cocaine for many many years.  He denies any tobacco use.  Ascites/ELÍAS Mild, managed with diuretics, furosemide 40 and amiloride 10 bid. No para. Swelling comes and goes and is better now.  HE on lactulose BM 2-3/d. Occ mild confusion and forgetfulness.  HCC 2 cm s/p TACE 12/2/21 in [place]    EGD 2020 severe esophagitis, 10/26/21 grade 1 varices  Colonoscopy 4/9/21 internal hemorrhoids. 6 mm polyp    Psychosocial He feels like his \"life is slipping away\". He feels like he will continue to get worse, like his liver is continuing to worsen.  He was previously homeless and is now living in " an apartment. He does not currently have mental health support. He states he has access to this. His visit in July 2021 with neurology: referred to neuropsych and behavioral health. He did not follow up on this.    Labs 12/1/21  TB 2.4  Na 127  Creat 0.8  INR 1.6    ROS: Cramping for which he takes baclofen, nerve pain on pregabalin.  PMH  Cirrhosis with portal hypertension  Alcohol use disorder  Chronic back pain  Hepatitis C infection  Hypertension  Traumatic brain injury  Narcissistic personality disorder  Polysubstance abuse  L5-S1 spine surgery in 2008  Cholecystectomy in the 80's  Herniorrhaphy (umbilical) in the 80's  Appendectomy in the 90's         SHX  He has been on disability for over 10 years given his back pain.  He previously worked as a .  In initial visit he stated that he has a stepfamily that he is  from. We talked more about them today and he now states he has family members around    FAMILY HISTORY  No colon/panc/esophageal/other GI CA, no other HNPCC-related Toy. No IBD/celiac, no other AI/liver/thyroid disease.    PHYSICAL EXAMINATION:  Exam  Gen Alert pleasant NAD  Resp No difficulty breathing. No cough  Skin No Jaundice  Eyes No icterus  Neuro ARCINIEGA  MSK no muscle wasting  Psyche Pleasant, appropriate. Well groomed.    Juarez Warren is a 56 year old male who is being evaluated via a billable video visit.    Video-Visit Details  Type of service:  Video Visit  Video Start Time: 830  Video End Time:854  Originating Location (pt. Location):home  Distant Location (provider location):  [facility] HEPATOLOGY CLINIC [place]      Platform used for Video Visit: [facility] or [place]

## 2021-12-06 NOTE — LETTER
"  12/6/2021     RE: Juarez Warren  [address] [place] [address]  [address] [place] [id number]    Dear Colleague,    Thank you for referring your patient, Juarez Warren, to the [facility] HEPATOLOGY [facility] [place]. Please see a copy of my visit note below.      [facility]  LIVER CLINIC CONSULTATION  VIDEO VISIT    CC/REFERRING PROVIDER:  Jessica Kaur NP [facility]  REASON FOR CONSULTATION: HCV and ETOH cirrhosis with HCC    ASSESSMENT/PLAN:  Mr. Warren is a 56 Y M with ETOH and HCV related cirrhosis and HCC. MELD 20 ABO O    Cirrhosis 2/2 ETOH and HCV. Decompensated with ascites, HE and synthetic dysfunction. Improved liver function since stopping ETOH 11/2020. TB peak 13.5, now 2.4.  HCC 2 cm s/p TACE at [facility]. They will get MRI follow up.  Hepatic encephalopathy Controlled on lactulose  Ascites Noted on most recent MRI, mild.  Never had paracentesis.  Currently on diuretics and controlled.  Dscussed sodium restriction.  Esophageal/Gastric varices  EGD 11/2020 small varices.  He is currently on propanolol, but they have not been able to increase his dose.  Colorectal Cancer Screening Due in 7 - 10 years    AUD severe in early remission PET 3/2021 negative.    Mental health Significant concern with this> Long h/o mental health issues, currently no care and no follow up after July visit. We discussed this a rec follow up as previously recommended by behavioral health at [facility].    In addition, he has documentation of narcissistic personality disorder. His distrust of therapists, and feelings of alcohol treatment not being \"good enough\" may be a sign of this.    Transplant candidacy He has significant barriers to transplant. These include poor social support and mental health issues (significant depression, significant history of drug and alcohol use, possible narcissistic personality disorder, and his distrust of therapists and alcohol treatment strategies). This was discussed with him today and " with LINDSAY.    Will plan to see him back pending progress on these issues. In meantime will follow with Jessica Kaur NP [facility].      Shirley Hall MD    Hepatology/Liver Transplant  Medical Director, Liver Transplantation  [facility]    Appointments [phone]  Clinic Fax [phone]  Transplant Care [phone] Option 4  Transplant Fax [phone]  Administrative Office [phone]  Administrative Fax [phone]  ==========================================    HPI: Mr. Warren is a 56 Y M with HCC and ETOH-related cirrhosis. I met with him once 4/2021. Since that time he has developed imaging criteria for HCC: 2 cm lesion which we reviewed at tumor conference.    Today he tells me that he feels horrible with swelling, dark urine, guts hurt. His stool looks dark brown. Vomiting in the morning for a while off and on. He repeatedly states that his health is bad and that he is afraid. He is not giving up but he is tired.    HCC 2 cm dx10/19/21  s/p TACE 12/2/21    HCV  Dx ~20 y ago. Rx PEG interferon.  However was using IV cocaine at that time and was reinfected. He has not had any treatments for this again, and did well until November 2020 when he was admitted with nausea, hematemesis, melena, fatigue and weakness.  He was found to have cirrhosis at that time.   VICTORIANO  Last ETOH 11/2020  Usual pattern was  6-12 cans of beer/day.    He has had 3 DUIs, the last about 5 years ago.   Has had treatment in the past, states he didn't like it,didn't think it worked    Past cocaine, methamphetamine use, last about 3 years ago.  He states that he has not used cocaine for many many years.  He denies any tobacco use.  Ascites/ELÍAS Mild, managed with diuretics, furosemide 40 and amiloride 10 bid. No para. Swelling comes and goes and is better now.  HE on lactulose BM 2-3/d. Occ mild confusion and forgetfulness.  HCC 2 cm s/p TACE 12/2/21 in [place]    EGD 2020 severe esophagitis, 10/26/21  "grade 1 varices  Colonoscopy 4/9/21 internal hemorrhoids. 6 mm polyp    Psychosocial He feels like his \"life is slipping away\". He feels like he will continue to get worse, like his liver is continuing to worsen.  He was previously homeless and is now living in an apartment. He does not currently have mental health support. He states he has access to this. His visit in July 2021 with neurology: referred to neuropsych and behavioral health. He did not follow up on this.    Labs 12/1/21  TB 2.4  Na 127  Creat 0.8  INR 1.6    ROS: Cramping for which he takes baclofen, nerve pain on pregabalin.  PMH  Cirrhosis with portal hypertension  Alcohol use disorder  Chronic back pain  Hepatitis C infection  Hypertension  Traumatic brain injury  Narcissistic personality disorder  Polysubstance abuse  L5-S1 spine surgery in 2008  Cholecystectomy in the 80's  Herniorrhaphy (umbilical) in the 80's  Appendectomy in the 90's         SHX  He has been on disability for over 10 years given his back pain.  He previously worked as a .  In initial visit he stated that he has a stepfamily that he is  from. We talked more about them today and he now states he has family members around    FAMILY HISTORY  No colon/panc/esophageal/other GI CA, no other HNPCC-related Toy. No IBD/celiac, no other AI/liver/thyroid disease.    PHYSICAL EXAMINATION:  Exam  Gen Alert pleasant NAD  Resp No difficulty breathing. No cough  Skin No Jaundice  Eyes No icterus  Neuro ARCINIEGA  MSK no muscle wasting  Psyche Pleasant, appropriate. Well groomed.    Juarez Warren is a 56 year old male who is being evaluated via a billable video visit.    Video-Visit Details  Type of service:  Video Visit  Video Start Time: 830  Video End Time:854  Originating Location (pt. Location):home  Distant Location (provider location):  [facility] HEPATOLOGY CLINIC [place]      Platform used for Video Visit: Segundo or Jamie    Again, thank you for allowing me to " participate in the care of your patient.      Sincerely,    Shirley Hall MD

## 2021-12-06 NOTE — PROGRESS NOTES
"Juarez is a 56 year old who is being evaluated via a billable video visit.      ** patient can do Doximity **    How would you like to obtain your AVS? MyChart  If the video visit is dropped, the invitation should be resent by: Text to cell phone: [phone]  Will anyone else be joining your video visit? No  {If patient encounters technical issues they should call [phone] :585255}    Video Start Time: {video visit start/end time for provider to select:152948}  Video-Visit Details    Type of service:  Video Visit    Video End Time:{video visit start/end time for provider to select:152948}    Originating Location (pt. Location): {video visit patient location:035885::\"Home\"}    Distant Location (provider location):  [facility] HEPATOLOGY CLINIC [place]     Platform used for Video Visit: {Virtual Visit Platforms:673748::\"[employer]\"}    "

## 2021-12-10 ENCOUNTER — TELEPHONE (OUTPATIENT)
Dept: GASTROENTEROLOGY | Facility: CLINIC | Age: 56
End: 2021-12-10
Payer: MEDICAID

## 2021-12-10 ENCOUNTER — DOCUMENTATION ONLY (OUTPATIENT)
Dept: GASTROENTEROLOGY | Facility: CLINIC | Age: 56
End: 2021-12-10
Payer: MEDICAID

## 2021-12-10 NOTE — TELEPHONE ENCOUNTER
Spoke to Jessica Kaur NP, [facility] regarding concerns for LT candidacy. She will see Mr. Warren in January with family members. We will stay in touch with progress.

## 2021-12-15 ENCOUNTER — TELEPHONE (OUTPATIENT)
Dept: TRANSPLANT | Facility: CLINIC | Age: 56
End: 2021-12-15
Payer: MEDICAID

## 2021-12-15 ENCOUNTER — TRANSFERRED RECORDS (OUTPATIENT)
Dept: HEALTH INFORMATION MANAGEMENT | Facility: CLINIC | Age: 56
End: 2021-12-15
Payer: MEDICAID

## 2021-12-15 NOTE — TELEPHONE ENCOUNTER
"Transplant Social Work Services Phone Call    Data: I called Juarez to inquire about his chemical dependency assessment. He completed on 12/14/2021 through [facility]. I provided my fax number for them to send this writer the assessment. I discussed recommendation to re-engage in behavioral health counseling. He reported that he does not have an issue and became upset because it doesn't have to do anything with his liver. I attempted to provide education, however patient was not able to process information. Juarez became upset. I re-focused the conversation on obtaining the chemical dependency assessment.     Intervention: Supportive listening and continued liver transplant evaluation.   Assessment: Juarez was labile throughout out conversation. He voiced that he is not going to go through any more \"hoops\" because these recommendations are not related to his liver. Throughout the conversation I asked clarifying questions due to patient moving from topic to topic. He would become upset with this writer and indicated that he needs to stop talking and he sometimes \"tell's people what they want to hear\"  Education provided by : No new education.   Plan/Recommendations:     The following are pending items    - Waiting for chemical dependency assessment. Will provide education regarding engagement in treatment  - Recommendation to engage in behavioral health (psychotherapy and psychiatry)   - Will need a caregiver/additional support  - consideration for updated neuropsychological testing.       HUGO Poon, [facility]  Liver Transplant   [facility]  Phone: [phone]  Pager: [phone]    "

## 2022-01-05 NOTE — TELEPHONE ENCOUNTER
I left a voice message for Juarez indicting that I have not received an assessment from [facility]. I left my direct phone number and fax number. I requested that he follows up with [facility].

## 2022-01-06 NOTE — TELEPHONE ENCOUNTER
I received information from [facility] that patient was seen for a substance use evaluation on 12/15/2021. Per assessment, there are no substance use recommendations based on that patient has been abstaining from alcohol for 12 months. Information sent to HIM to be scanned into patient's EMR.

## 2022-01-17 ENCOUNTER — TELEPHONE (OUTPATIENT)
Dept: TRANSPLANT | Facility: CLINIC | Age: 57
End: 2022-01-17

## 2022-01-17 ENCOUNTER — DOCUMENTATION ONLY (OUTPATIENT)
Dept: TRANSPLANT | Facility: CLINIC | Age: 57
End: 2022-01-17

## 2022-01-17 NOTE — TELEPHONE ENCOUNTER
"Transplant Social Work Services Phone Call      Data: This writer received two voice messaged from patient. He reported that he received a envelope from this writer with paperwork he needs to fill out and return (Health care directive and release of information). This writer asked Juarez to fill out DUNG after he re-establishes care with a mental health provider as recommended. Juarez indicated I his voice message that he believes that this is all \"bologna\" he asked this writer \"what do you think I am, you are wrong about me needing behavioral health\" He indicated in his voice message that this writer needs behavioral health for my psychological issues and that this writer believes she is better than everyone else. He reported that this writer is bullying him and said to \"quit being stupid about this\" He called back and reported that he never had any mental health issues or chemical health issues. He indicated that \"this writer needs mental health\" and ended the call.  Intervention: Deferred  Assessment: This writer previously discussed re-engaging in mental health support. Per his voice message he does not believe he needs mental health support.  Education provided by : No new education  Plan: This writer will continue to assist in liver transplant evaluation. Will address comments with patient if needed    HUGO Poon, [facility]  Liver Transplant   [facility]  Phone: [phone]  Pager: [phone]    "

## 2022-01-18 NOTE — PROGRESS NOTES
Patient not candidate at this time for psycho social concerns, see notes from SW as well as most recent note from Jessica Kaur, [facility] dated 1/14/22.  Will not contact patient further at this time, ill await contact from patient  To this provider or .      Per discussion with team and Dr. Hall, will plan to close referral at this time.

## 2022-01-24 ENCOUNTER — TELEPHONE (OUTPATIENT)
Dept: TRANSPLANT | Facility: CLINIC | Age: 57
End: 2022-01-24

## 2022-01-24 NOTE — TELEPHONE ENCOUNTER
"Transplant Social Work Services Phone Call    Data: I received a voice message from patient at 1700 on 1/21/2022 requesting a call back. See notes from Deborah Malik on 1/17/2022. I called Juarez and left a voice message on 1/24/2022.   I received a voice message from Jessica Kaur NP (ph: [phone]). She reported that she had a visit with Juarez on Friday. She indicated that he was very upset that his evaluation was being closed. Jessica inquired if we are able to also provide information to patient.   I received a call back from Juarez and his brother, Nico. I spoke with Juarez regarding evaluation for liver transplantation. Juarez reported that he forgets information and doesn't remember he is doing or saying. He reported that it is traumatic to review his chemical and mental health history. I provided education regarding the \"why\" we need to know this information. He voiced understanding. Today, Juarez reported that he had neuropsychological testing (with Dr Marie) scheduled and see's a psychiatrist through [facility]. He was unable to provide further information regarding this. I reported that I will send information Consent to communicate to Nico's e-mail ([email or web address]) and his home.   Recommendations are as follows: (recommdnations were communicated with Juarez and Nico)   - Establish care with a 1:1 counselor. Recommendation for ongoing and consistent appointments. Please fill out a release of information so I am able to speak with them.   - Neuropsychological testing  - Caregiver plan. TBD. Today Nico reported that he is able to provide support.     Intervention: Supportive listening   Assessment: Juarez had a hard time tracking information and remembering conversation today. He presented with his brother Nico via phone.   Education provided by SW: Reviewed recommendation   Plan: Information provided to Lissette and Shirley Hall.        HUGO Poon, [facility]  Liver Transplant   [facility] " [place]  Phone: [phone]  Pager: [phone]

## 2022-02-07 ENCOUNTER — TELEPHONE (OUTPATIENT)
Dept: TRANSPLANT | Facility: CLINIC | Age: 57
End: 2022-02-07

## 2022-02-07 NOTE — TELEPHONE ENCOUNTER
I received the following information from Juarez's brother, Nico.     Good evening Maria E.  Thanks again for working with Juarez and helping him out.  I will be with him tomorrow as he has a few appointments I will take him to [facility].  As he mentioned last Monday, I have been taking him to his liver appointments  2/week since November as a few other appointments that I am aware of.    I will try to call you if I get time tomorrow.  My contact information is below:    Nico Warren  [address] [place] [address]  [phone]    Thanks again.    Nico

## 2022-02-21 NOTE — TELEPHONE ENCOUNTER
"I received \"person to person communication consent\"   Juarez consented for [facility] to communication with the following     Nico Kelvin - Brother - [phone]  Fransiscavera Hernandes - Sister - [phone]  Wilfredo Hernandes - Brother in law - [phone]    Form sent to HIM to be scanned into EMR  "

## 2022-02-22 NOTE — TELEPHONE ENCOUNTER
I received a voice message from pt inquiring when he will be scheduled for his liver transplant evaluation.     I called Juarez and left a voice message requesting a call back to discuss recommendation. I also provided transplant coordinator's phone number to evaluation questions.     Recommendations previously sent to Nico and Juarez     - Establish care with a 1:1 counselor. Recommendation for ongoing and consistent appointments. Please fill out a release of information so I am able to speak with them.   - Neuropsychological testing  - Caregiver plan. TBD. Today Nico reported that he is able to provide support.

## 2022-02-28 NOTE — TELEPHONE ENCOUNTER
I received a completed copy of patient health care directive from patient's brother. I sent document to [facility] for review.    I inquired about establishing care with 1:1 mental health therapist and psychiatrist.     Plan: waiting for response.

## 2022-03-01 ENCOUNTER — DOCUMENTATION ONLY (OUTPATIENT)
Dept: OTHER | Facility: CLINIC | Age: 57
End: 2022-03-01

## 2022-03-02 NOTE — TELEPHONE ENCOUNTER
I received 3 voice message from patient on 3/1/2022. I returned his call and left a voice message on 3/3/2022 with a call back number     I provided information regarding the health care directive to Nico. Nico will complete with his brother and send to this writer.

## 2022-03-03 NOTE — TELEPHONE ENCOUNTER
I received a voice message on 3/3 requesting a call back. I called back and patient did not answer. I asked patient to leave days/times that he will be available. I also suggested that we can schedule an appointment.

## 2022-03-03 NOTE — TELEPHONE ENCOUNTER
Juarez and I spoke on the phone. I recommended that we send his brother Nico a summary of our conversation. The following information was sent to Nico with Juarezs permission.      - Recommendation to establish care with a 1:1 psychotherapist. They have psychotherapists at [facility]  - Sign a release of information for Aylin Lua through [facility] - I attached one as well as sent one in the mail  - Our team would like to have an idea of a caregiver plan if transplant were to occur with our center. We recommend that Juarez stays local within 50 miles of our center for about a month after transplant. We ask that someone stays with him 24/7 while he is here to provide caregiving.   Juarez reported that he has a neuropsych appointment on March 11th. He asked if he should keep the appointment and I reiterated multiple times throughout the conversation to keep all of his appointments.     I sent Juarez a release of information via mail as well per his request    Plan: Waiting for Juarez to establish care with a 1:1 provider. Waiting for DUNG for [facility]. Caregiver plan TBD

## 2022-03-03 NOTE — TELEPHONE ENCOUNTER
I received a call from Juarez. He inquired further about the recommendation. I reiterated recommendations for psychotherapy.     Juarez reported that he has an appointment at 0830 with [facility]. He reports that he was told by Jessica Kaur that he can only be evaluated with one transplant center. I reported to Juarez that he is not in evaluation at our center and I would recommend calling [facility] and/or insurance to make sure things are approved. He became frustrated at this writer due to not helping facilitate this call. I provided education that since this is not with out center and since it is a billing question, it would need to go through [facility] and insurance. Juarez reported that he is done with the conversation as this writer it not being helpful.

## 2022-03-16 ENCOUNTER — TELEPHONE (OUTPATIENT)
Dept: TRANSPLANT | Facility: CLINIC | Age: 57
End: 2022-03-16

## 2022-03-16 NOTE — TELEPHONE ENCOUNTER
Transplant Social Work Services Phone Call    Data: I received an e-mail from patient's brother asking to speak with this writer re: Juarez's status. I replied to email and indicated that Lance NIEVES Is his coordinator and would assist with any status, but this writer is happy to discuss.   I received a voice message from Nico indicating that he will call Lance. Nico noted that Juarez has some family near the [facility] and he would have adequate transportation to/from our center and would be able to stay with the family member in [place].   Intervention: consultation   Assessment: no new assessment at this time. Please see previous notes re: recommendations from this writer   Plan: This writer will be available for ongoing psychosocial support    HUGO Taylor, [facility]  Liver Transplant   [facility]  Phone: [phone]  Pager: [phone]

## 2022-05-24 ENCOUNTER — TELEPHONE (OUTPATIENT)
Dept: TRANSPLANT | Facility: CLINIC | Age: 57
End: 2022-05-24

## 2022-05-24 NOTE — TELEPHONE ENCOUNTER
Transplant Social Work Services Phone Call    Data: I consulted with our transplant team re: next steps for patient. Our center has closed his referral. Patient will need to be re-referred when appropriate.   Recommendation for patient to engage in 1:1 therapy. This has been communicated with patient and/or brother 6x. Juarez completed Neuropsychological testing on 3/26/2022 at [facility] with Loni Stubbs Ph.D. Testing recommended the following:   RECOMMENDATIONS  1) A review of the patient s psychiatric medications is recommended given his current reported mood symptoms. A follow-up with primary care or with Psychiatry is advised.   2) The patient may also benefit from individual psychotherapy to learn better coping skills and symptom management. A referral to Adult Behavioral Health is recommended.   3) From a cognitive perspective, the patient should be able to follow post-transplant care instructions and attend follow-up appointments.   I spoke with Jessica Kaur NP from [facility]. I reported that we've closed his referral due to psychosocial concerns. I indicated that I've spoke with pt and brother 6+ times regarding recommendation to establish care with a 1:1 psychotherapy, which was affirmed by his neuropsych testing. Patient was also referred earlier in 2021 and did not follow up. Concerns with compliance and unmanaged mental health. Jessica inquired at what point we would consider evaluating him for transplant. I indicated that he will need to establish care with a 1:1 therapist, and we will take into account their assessment of his mental health stability as well. Patient will need to show a pattern of consistent follow up. Jessica specifically asked if he has sworn or become angry with anyone at our center. I reported that he has with me, several times. She voiced understanding to re-refer once he has his mental health more managed, whether it be a formal referral, or conversation with this  writer/transplant team.    Intervention: Consultation for transplant   Assessment: Juarez is not a liver transplant candidate at this time. He has not demonstrated a pattern of compliance and presents with severe unmanaged mental health concerns. He has been referred to a mental health therapist by three different providers (hospitalization, [facility]/this writer, and neuropsychology testing) in the past year, and he has not followed through with recommendation.   Plan: Juarez will need to demonstrate consistent follow up with a mental health provider and his medical care. Jessica/BASSEM will re-refer when more appropriate.       HUGO Taylor, [facility]  Liver Transplant   [facility]  Phone: [phone]  Pager: [phone]

## 2022-05-26 NOTE — TELEPHONE ENCOUNTER
I received a call from Jessica inquiring further re: recommendation. She indicated that she could have psychiatry and/or psychotherapy see patient and clear/not clear him for transplant. I reported that we will need consistent follow up. Jessica requested that I send her a list of OP providers.

## 2022-10-26 ENCOUNTER — TELEPHONE (OUTPATIENT)
Dept: TRANSPLANT | Facility: CLINIC | Age: 57
End: 2022-10-26

## 2022-10-26 NOTE — TELEPHONE ENCOUNTER
Transplant Social Work Services Phone Call    Data: I received a voice message from Nico Warren, patient's brother. He reported that he spoke with this writer about a year go. Nico indicated that patient was evaluated by [facility] in the spring and was denied for transplant at [facility]. Nico inquired if we would re-consider him for transplant and what they will need to do to get him re-engaged with our program.   Intervention: deferred  Assessment: Juarez was referral to our transplant center by Jessica Kaur NP. His referral was closed d/t psychosocial concerns, specifically unmanaged mental health. Recommendation to establish care with a 1:1 therapist, and will need to have consistent follow-up.  Education provided by SW: Deferred  Plan: Message sent to Lissette MCKINNEY Regarding telephone call.     HUGO Taylor, [facility]  Liver Transplant   [facility]  Phone: [phone]  Pager: [phone]

## 2022-11-03 ENCOUNTER — TELEPHONE (OUTPATIENT)
Dept: TRANSPLANT | Facility: CLINIC | Age: 57
End: 2022-11-03

## 2022-11-03 NOTE — TELEPHONE ENCOUNTER
See recent encounter from Maria E social work.  Patient's brother had inquired about him potentially being considered for transplant eval    Reviewed with Maria E and Dr. Hall.     Confirmed with brother that he has not engaged in mental health care.  Per last discussion with patient and referring provider, patient needs to engage in mental health care and stabilize from that perspective until potential consideration.  Encouraged them to continue close follow up for GI care with [facility].  Patient's brother verbalizes understanding, patient still resistant at this time to mental health services.

## 2023-06-22 ENCOUNTER — TELEPHONE (OUTPATIENT)
Dept: TRANSPLANT | Facility: CLINIC | Age: 58
End: 2023-06-22

## 2023-06-22 NOTE — TELEPHONE ENCOUNTER
Juarez would like to see if he can be re-listed here at the [facility]  for Liver?      # [phone]

## 2023-07-11 ENCOUNTER — DOCUMENTATION ONLY (OUTPATIENT)
Dept: TRANSPLANT | Facility: CLINIC | Age: 58
End: 2023-07-11

## 2023-07-11 NOTE — PROGRESS NOTES
July 11, 2023 1:58 PM - Lance Cardenas, RN:     This RN spoke with Jessica Kaur, who reported patient is not a transplant candidate at this time. Patient is having more frequent falls, resulting in compression fracture of thoracic vertebrae. When questioned by Jessica Kaur as to his insight into his falling, he is unable to provide detail. Patient was deemed as benefiting from assisted living but refusing at this point. Patient has not followed up with mental health counseling or therapy as recommended by our transplant team previously. Jessica Kaur reports patient continues to have significant relationship problems with his brother, is frequently argumentative, and will not update his brother as to what is going on with him.    Most recent set of labs: 7/7/2023 = Na 137, Cr 0.69, T-Bili 1.9, Albumin 3.0, 6/30/23 INR = 1.3 for a MELD of 12.    Jessica Kaur will send another referral for transplant if/when patient demonstrates greater insight/compliance.